# Patient Record
Sex: MALE | Employment: FULL TIME | URBAN - METROPOLITAN AREA
[De-identification: names, ages, dates, MRNs, and addresses within clinical notes are randomized per-mention and may not be internally consistent; named-entity substitution may affect disease eponyms.]

---

## 2019-10-31 ENCOUNTER — HOSPITAL ENCOUNTER (EMERGENCY)
Facility: CLINIC | Age: 24
Discharge: HOME | End: 2019-10-31
Attending: EMERGENCY MEDICINE | Admitting: EMERGENCY MEDICINE

## 2019-10-31 ENCOUNTER — APPOINTMENT (OUTPATIENT)
Dept: GENERAL RADIOLOGY | Facility: CLINIC | Age: 24
End: 2019-10-31
Attending: EMERGENCY MEDICINE

## 2019-10-31 VITALS
TEMPERATURE: 98.7 F | DIASTOLIC BLOOD PRESSURE: 65 MMHG | SYSTOLIC BLOOD PRESSURE: 130 MMHG | RESPIRATION RATE: 12 BRPM | OXYGEN SATURATION: 100 %

## 2019-10-31 DIAGNOSIS — S62.320A CLOSED DISPLACED FRACTURE OF SHAFT OF SECOND METACARPAL BONE OF RIGHT HAND, INITIAL ENCOUNTER: ICD-10-CM

## 2019-10-31 PROCEDURE — 73130 X-RAY EXAM OF HAND: CPT | Mod: RT

## 2019-10-31 PROCEDURE — 99284 EMERGENCY DEPT VISIT MOD MDM: CPT | Mod: 25

## 2019-10-31 PROCEDURE — 26605 TREAT METACARPAL FRACTURE: CPT | Mod: F6

## 2019-10-31 PROCEDURE — 25000132 ZZH RX MED GY IP 250 OP 250 PS 637: Performed by: EMERGENCY MEDICINE

## 2019-10-31 PROCEDURE — 40000986 XR HAND RT G/E 3 VW: Mod: RT

## 2019-10-31 RX ORDER — ACETAMINOPHEN 325 MG/1
975 TABLET ORAL ONCE
Status: COMPLETED | OUTPATIENT
Start: 2019-10-31 | End: 2019-10-31

## 2019-10-31 RX ORDER — IBUPROFEN 600 MG/1
600 TABLET, FILM COATED ORAL ONCE
Status: COMPLETED | OUTPATIENT
Start: 2019-10-31 | End: 2019-10-31

## 2019-10-31 RX ORDER — TRAMADOL HYDROCHLORIDE 50 MG/1
50 TABLET ORAL EVERY 6 HOURS PRN
Qty: 8 TABLET | Refills: 0 | Status: SHIPPED | OUTPATIENT
Start: 2019-10-31 | End: 2019-11-03

## 2019-10-31 RX ADMIN — IBUPROFEN 600 MG: 600 TABLET, FILM COATED ORAL at 17:49

## 2019-10-31 RX ADMIN — ACETAMINOPHEN 975 MG: 325 TABLET, FILM COATED ORAL at 17:48

## 2019-10-31 NOTE — ED TRIAGE NOTES
Pt aox4, abc's intact. Pt arrives c/o right hand pain after falling down the stairs. Now has right hand swelling between his thumb and pointer finger. CMS intact.

## 2019-10-31 NOTE — DISCHARGE INSTRUCTIONS
You should take those milligrams of Tylenol every 6 hours as well as 600 mg of ibuprofen every 6 hours for pain.  You can ice your hand through your splint but do not get your splint wet.  You must make an appointment to follow-up in the orthopedics clinic for recheck.  You should also elevate your hand to help with the pain.  If your pain is not improved as above, and you can take the stronger pain medications.  You should not use any other substances, drive or use alcohol if you are taking tramadol.

## 2019-10-31 NOTE — ED PROVIDER NOTES
History     Chief Complaint:    Hand Injury            Tobin De Souza is a 24 year old male past medical history significant for marijuana use disorder presenting for right hand pain after he slipped walking downstairs at his friend's house due to slippery socks.  He did not fall on the stairs but reached his right hand back brace himself.  Denies any his head, no neck pain denies LOC.  Endorses hand pain over the base of the first and second metacarpals.  Denies any numbness or tingling in his hand, denies any wrist or elbow pain.  He has not taken anything for pain.    Allergies:  No Known Allergies     Medications:    acetaminophen (TYLENOL) 325 MG tablet  IBUPROFEN        Past Medical History:    Past Medical History:   Diagnosis Date     Malignant hyperthermia        There are no active problems to display for this patient.       Past Surgical History:    Past Surgical History:   Procedure Laterality Date     CLOSED REDUCTION NOSE  10/12/2011    Procedure:CLOSED REDUCTION NOSE; Closed Reduction Nasal Fracture, Right Side Nasal Cautery; Surgeon:ZE GARCIA; Location:[facility] OR     none          Family History:    family history is not on file.    Social History:   reports that he has never smoked. He does not have any smokeless tobacco history on file. He reports current drug use. Drug: Marijuana. He reports that he does not drink alcohol.    PCP: [facility], [place]     Review of Systems  Positive for right hand pain and swelling, negative for numbness or tingling.    Physical Exam     Patient Vitals for the past 24 hrs:   BP Temp Temp src Heart Rate Resp SpO2   10/31/19 1739 130/65 98.7  F (37.1  C) Oral 71 12 100 %        Physical Exam    Constitutional: Alert, attentive, GCS 15, smells of marijuana  HENT:    Nose: Nose normal.    Mouth/Throat: Oropharynx is clear, mucous membranes are moist  Eyes: EOM are normal, anicteric, conjugate gaze  CV: Radial and ulnar pulses intact and symmetric, cap refill  less than 2 seconds in all 5 digits.  Chest: Nonlabored breathing on room air  GI:  non tender. No distension. No guarding or rebound.    MSK: Swelling at the base of the  second metacarpal with focal bony tenderness over swelling, no other focal bony tenderness of the fingers wrist or anatomic snuffbox.  Finger flexion full strength limited due to pain.  Neurological: Alert, attentive, moving all extremities equally.   Skin: Skin is warm and dry.    Emergency Department Course     Imaging:    XR Hand Right G/E 3 Views   Preliminary Result   IMPRESSION: Splint has been placed across the fracture of the second metacarpal with improved alignment. Very minimal volar angulation remains distally.      XR Hand Right G/E 3 Views   Final Result   IMPRESSION: Mildly displaced and palmar angulated mid second metacarpal fracture.         Procedures:  [facility]    -Fracture  Date/Time: 10/31/2019 7:10 PM  Performed by: Darrel Wallis MD  Authorized by: Darrel Wallis MD       INJURY      Injury location:  Hand    Hand injury location:  R hand    Hand fracture type: second metacarpal      PRE PROCEDURE ASSESSMENT      Neurological function: normal      Distal perfusion: normal      Range of motion: normal      ANESTHESIA (see MAR for exact dosages)      Anesthesia method:  None    PROCEDURE DETAILS:     Manipulation performed: yes      Reduction successful: yes      X-ray confirmed reduction: yes      Immobilization:  Splint    Splint type:  Sugar tong    Supplies used:  Plaster    POST PROCEDURE ASSESSMENT      Neurological function: normal      Distal perfusion: normal      Range of motion: normal      PROCEDURE   Patient Tolerance:  Patient tolerated the procedure well with no immediate complications        Interventions:    Medications   acetaminophen (TYLENOL) tablet 975 mg (975 mg Oral Given 10/31/19 1719)   ibuprofen (ADVIL/MOTRIN) tablet 600 mg (600 mg Oral Given 10/31/19 6915)         Impression & Plan    Medical Decision Makin-year-old male with no significant past medical history presenting for evaluation of right hand pain after falling down stairs landing on his outstretched hand.  He has localized swelling of the dorsum of the left hand at the base of the second metacarpal, he is neurovascularly intact with full flexion of his fingers.  Exam suggestive of metacarpal fracture, x-rays show dorsally angulated second metacarpal midshaft fracture with 33 degrees volar flexion.  Intrinsic plus sugar tong splint was applied with successful manipulation to reduce the dorsal angulation.  He remained neurovascularly intact, plan is for follow-up in orthopedics clinic.  He was given a short course of tramadol for pain control however I recommended Tylenol, ibuprofen elevation and ice.  Return precautions were reviewed including signs and symptoms of splint that is too tight.     Diagnosis:    ICD-10-CM    1. Closed displaced fracture of shaft of second metacarpal bone of right hand, initial encounter S62.320A         Discharge Medications:  Discharge Medication List as of 10/31/2019  7:02 PM      START taking these medications    Details   traMADol (ULTRAM) 50 MG tablet Take 1 tablet (50 mg) by mouth every 6 hours as needed for severe pain, Disp-8 tablet, R-0, Local Print           Darrel Wallis MD  [employer]  7:12 PM 10/31/19        10/31/2019   Darrel Wallis MD Dunbar, John Forrest, MD  10/31/19 1912

## 2019-10-31 NOTE — ED AVS SNAPSHOT
[facility] Emergency Department  [address]  [facility] [address]  Phone:  [phone]  Fax:  [phone]                                    Tobin De Souza   MRN: [id number]    Department:  [facility] Emergency Department   Date of Visit:  10/31/2019           After Visit Summary Signature Page    I have received my discharge instructions, and my questions have been answered. I have discussed any challenges I see with this plan with the nurse or doctor.    ..........................................................................................................................................  Patient/Patient Representative Signature      ..........................................................................................................................................  Patient Representative Print Name and Relationship to Patient    ..................................................               ................................................  Date                                   Time    ..........................................................................................................................................  Reviewed by Signature/Title    ...................................................              ..............................................  Date                                               Time          22EPIC Rev 08/18